# Patient Record
Sex: FEMALE | Race: BLACK OR AFRICAN AMERICAN | NOT HISPANIC OR LATINO | URBAN - METROPOLITAN AREA
[De-identification: names, ages, dates, MRNs, and addresses within clinical notes are randomized per-mention and may not be internally consistent; named-entity substitution may affect disease eponyms.]

---

## 2023-08-29 ENCOUNTER — EMERGENCY (EMERGENCY)
Facility: HOSPITAL | Age: 38
LOS: 1 days | Discharge: HOME | End: 2023-08-29
Admitting: STUDENT IN AN ORGANIZED HEALTH CARE EDUCATION/TRAINING PROGRAM
Payer: COMMERCIAL

## 2023-08-29 VITALS
DIASTOLIC BLOOD PRESSURE: 91 MMHG | HEIGHT: 64 IN | WEIGHT: 250 LBS | TEMPERATURE: 99 F | SYSTOLIC BLOOD PRESSURE: 143 MMHG | OXYGEN SATURATION: 96 % | RESPIRATION RATE: 16 BRPM | HEART RATE: 72 BPM

## 2023-08-29 PROCEDURE — 99284 EMERGENCY DEPT VISIT MOD MDM: CPT

## 2023-08-30 VITALS
RESPIRATION RATE: 16 BRPM | DIASTOLIC BLOOD PRESSURE: 72 MMHG | OXYGEN SATURATION: 96 % | SYSTOLIC BLOOD PRESSURE: 109 MMHG | TEMPERATURE: 98 F | HEART RATE: 68 BPM

## 2023-08-30 PROCEDURE — 93971 EXTREMITY STUDY: CPT | Mod: 26,RT

## 2023-08-30 PROCEDURE — 99284 EMERGENCY DEPT VISIT MOD MDM: CPT | Mod: 25

## 2023-08-30 PROCEDURE — 93971 EXTREMITY STUDY: CPT

## 2023-08-30 PROCEDURE — 96372 THER/PROPH/DIAG INJ SC/IM: CPT

## 2023-08-30 RX ORDER — CYCLOBENZAPRINE HYDROCHLORIDE 10 MG/1
1 TABLET, FILM COATED ORAL
Qty: 14 | Refills: 0
Start: 2023-08-30 | End: 2023-09-05

## 2023-08-30 RX ORDER — ACETAMINOPHEN 500 MG
975 TABLET ORAL ONCE
Refills: 0 | Status: COMPLETED | OUTPATIENT
Start: 2023-08-30 | End: 2023-08-30

## 2023-08-30 RX ORDER — LIDOCAINE 4 G/100G
1 CREAM TOPICAL ONCE
Refills: 0 | Status: COMPLETED | OUTPATIENT
Start: 2023-08-30 | End: 2023-08-30

## 2023-08-30 RX ORDER — CYCLOBENZAPRINE HYDROCHLORIDE 10 MG/1
10 TABLET, FILM COATED ORAL ONCE
Refills: 0 | Status: COMPLETED | OUTPATIENT
Start: 2023-08-30 | End: 2023-08-30

## 2023-08-30 RX ORDER — KETOROLAC TROMETHAMINE 30 MG/ML
15 SYRINGE (ML) INJECTION ONCE
Refills: 0 | Status: DISCONTINUED | OUTPATIENT
Start: 2023-08-30 | End: 2023-08-30

## 2023-08-30 RX ORDER — LIDOCAINE 4 G/100G
1 CREAM TOPICAL
Qty: 1 | Refills: 0
Start: 2023-08-30 | End: 2023-09-03

## 2023-08-30 RX ADMIN — CYCLOBENZAPRINE HYDROCHLORIDE 10 MILLIGRAM(S): 10 TABLET, FILM COATED ORAL at 02:53

## 2023-08-30 RX ADMIN — Medication 15 MILLIGRAM(S): at 02:52

## 2023-08-30 RX ADMIN — Medication 975 MILLIGRAM(S): at 02:52

## 2023-08-30 RX ADMIN — LIDOCAINE 1 PATCH: 4 CREAM TOPICAL at 02:52

## 2023-08-30 NOTE — ED PROVIDER NOTE - CLINICAL SUMMARY MEDICAL DECISION MAKING FREE TEXT BOX
otherwise healthy here w low back pain and right leg pain since sitting on 8 hr flight 3 days ago. no associated infectious or neurologic symptoms.  pt well appearing, stable vitals - no midline tenderness, neuro intact. RLE w tenderness to calf but no bony tenderness  possible msk pain / herniated disc / muscle strain/spasm  with long lfight will r/o dvt. ddx also includes bakers cyst.   XR not indicated clinically given no trauma and nontender midline and no bony tenderness of extremity  advanced imaging not indicated given no concern for cauda equina at this time   -will give analgesia - NSAIDs, lidocaine patch, and muscle relaxer and reeval.   if improvement and no changes in clinical exam will plan for dc w supportive care and ortho / spine follow up.

## 2023-08-30 NOTE — ED PROVIDER NOTE - OBJECTIVE STATEMENT
38 yr old female, denies medical history, presents to the Emergency Department w leg pain. pt w 3 days of low back pain and right leg pain. started after sitting on 8 hour flight, felt uncomfortable during flight. no injury or trauma. pain worse w movement, better w rest. has not tried home meds. today pain in back of leg was worse so came for evaluation.   no fever, cp, sob. no extremity weakness / numbness / tingling. no hx dvt / pe. no bladder / bowel dysfunction, no abd pain. no onc history, hx ivdu.

## 2023-08-30 NOTE — ED ADULT NURSE REASSESSMENT NOTE - NS ED NURSE REASSESS COMMENT FT1
Patient is asleep on recliner, breathing spontaneously, chest rise visible, symmetrical even and unlabored; easy to arouse by calling patient's name. Patient reports feeling better after receiving medication. Patient pending dispo.

## 2023-08-30 NOTE — ED ADULT NURSE NOTE - OBJECTIVE STATEMENT
38 y.o. female complaining of right lower back pain and LLE pain with swelling; making it difficult to bear weight.  Patient reports two days ago she took an 8hr flight and when she got home the symptoms developed. Patient denies any numbness or tingling, denies any recent trauma or injury. Patient is AAOx4, GCS 15, breathing spontaneously, chest rise visible, symmetrical even and unlabored. Patient denies medical history.

## 2023-08-30 NOTE — ED PROVIDER NOTE - PATIENT PORTAL LINK FT
You can access the FollowMyHealth Patient Portal offered by [facility] by registering at the following website: [email or web address]. By joining [facility]’s FollowMyHealth portal, you will also be able to view your health information using other applications (apps) compatible with our system.

## 2023-08-30 NOTE — ED PROVIDER NOTE - PHYSICAL EXAMINATION
Constitutional : Well appearing, non-toxic, no acute distress. awake, alert, oriented to person, place, time/situation.  Head : head normocephalic, atraumatic  EENMT : eyes clear bilaterally, PERRL, EOMI. airway patent. moist mucous membranes. neck supple.  Cardiac : Extremities warm and well perfused. 2+ radial and DP pulses. cap refill <2 seconds. no LE edema.  Resp : Respirations even and unlabored.   GI : abdomen soft, nontender, nondistended  MSK :  RLE - no bony tenderness, +tenderness popiteal region, able to full range knee. no tenderness and normal ROM of hip, ankle, foot. no effusion. 2+ DP pulses, sensation intact distally, 5/5 strength proximal and distal extremity, cap refill <2 seconds  extremities otherwise - range of motion is not limited, no muscle or joint tenderness  Back : No evidence of trauma. mild right lumbar paraspinal tenderness, no midline tenderness. no C/T tenderness.   Neuro : Alert and oriented, CNII-XII grossly intact, no focal deficits, no motor or sensory deficits.  Skin : Skin normal color for race, warm, dry and intact. No evidence of rash.  Psych : Alert and oriented to person, place, time/situation. normal mood and affect. no apparent risk to self or others.

## 2023-08-30 NOTE — ED PROVIDER NOTE - PROGRESS NOTE DETAILS
US negative for dvt. pain improved w meds in ED>.  will dc w outpt ortho follow up prn.   remains neurovascularly intact. stable for dc.     All results reviewed with the patient verbally. Discharge plan and return precautions d/w pt who verbalized understanding and agrees with plan. All questions answered. Vitals WNL. Ready for d/c.

## 2023-08-31 DIAGNOSIS — M54.50 LOW BACK PAIN, UNSPECIFIED: ICD-10-CM
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
## 2023-08-31 DIAGNOSIS — M79.661 PAIN IN RIGHT LOWER LEG: ICD-10-CM
